# Patient Record
Sex: MALE | Race: BLACK OR AFRICAN AMERICAN
[De-identification: names, ages, dates, MRNs, and addresses within clinical notes are randomized per-mention and may not be internally consistent; named-entity substitution may affect disease eponyms.]

---

## 2018-02-06 ENCOUNTER — HOSPITAL ENCOUNTER (EMERGENCY)
Dept: HOSPITAL 72 - EMR | Age: 23
Discharge: HOME | End: 2018-02-06
Payer: SELF-PAY

## 2018-02-06 VITALS — HEIGHT: 71 IN | BODY MASS INDEX: 22.4 KG/M2 | WEIGHT: 160 LBS

## 2018-02-06 VITALS — DIASTOLIC BLOOD PRESSURE: 71 MMHG | SYSTOLIC BLOOD PRESSURE: 104 MMHG

## 2018-02-06 VITALS — DIASTOLIC BLOOD PRESSURE: 75 MMHG | SYSTOLIC BLOOD PRESSURE: 110 MMHG

## 2018-02-06 DIAGNOSIS — J45.901: Primary | ICD-10-CM

## 2018-02-06 PROCEDURE — 94664 DEMO&/EVAL PT USE INHALER: CPT

## 2018-02-06 PROCEDURE — 99283 EMERGENCY DEPT VISIT LOW MDM: CPT

## 2018-02-06 PROCEDURE — 94640 AIRWAY INHALATION TREATMENT: CPT

## 2018-02-06 RX ADMIN — Medication SCH MCG: at 22:30

## 2018-02-06 RX ADMIN — Medication SCH MCG: at 22:43

## 2018-02-06 RX ADMIN — Medication SCH MCG: at 22:36

## 2018-02-06 RX ADMIN — ALBUTEROL SULFATE SCH MG: 2.5 SOLUTION RESPIRATORY (INHALATION) at 22:43

## 2018-02-06 RX ADMIN — ALBUTEROL SULFATE SCH MG: 2.5 SOLUTION RESPIRATORY (INHALATION) at 22:36

## 2018-02-06 RX ADMIN — ALBUTEROL SULFATE SCH MG: 2.5 SOLUTION RESPIRATORY (INHALATION) at 22:31

## 2018-02-07 NOTE — EMERGENCY ROOM REPORT
History of Present Illness


General


Chief Complaint:  Asthma


Source:  Patient





Present Illness


HPI


22-year-old male presents ED for evaluation.  Patient presents with shortness 

of breath and wheezing.  Started yesterday.  patient states history of asthma 

but has not required an inhaler for some time.  Per triage O2 sat in the 80s.  

Patient denies any fevers or chills.  Denies cough.  Denies chest pain.  Denies 

sick contacts or recent travel.  No other aggravating or leading factors.  

Denies any other associated symptoms


Allergies:  


Coded Allergies:  


     No Known Allergies (Unverified , 2/6/18)





Patient History


Past Medical History:  none


Past Surgical History:  none


Pertinent Family History:  none


Social History:  Denies: smoking, alcohol use, drug use


Immunizations:  UTD


Reviewed Nursing Documentation:  PMH: Agreed, PSxH: Agreed





Nursing Documentation-PMH


Past Medical History:  No History, Except For


Hx Asthma:  Yes - bronchitis





Review of Systems


All Other Systems:  negative except mentioned in HPI





Physical Exam





Vital Signs








  Date Time  Temp Pulse Resp B/P (MAP) Pulse Ox O2 Delivery O2 Flow Rate FiO2


 


2/6/18 21:59 99.0 94 18 104/71 88 Room Air  


 


2/6/18 22:15        88


 


2/6/18 22:25       2.0 








Sp02 EP Interpretation:  reviewed, abnormal


General Appearance:  no apparent distress, alert, GCS 15, non-toxic


Head:  normocephalic, atraumatic


ENT:  normal ENT inspection


Neck:  normal inspection


Respiratory:  decreased breath sounds, wheezing


Cardiovascular #1:  regular rate, rhythm, no edema


Gastrointestinal:  normal inspection


Rectal:  deferred


Genitourinary:  no CVA tenderness


Musculoskeletal:  normal inspection


Neurologic:  alert, oriented x3, responsive, motor strength/tone normal, 

sensory intact, speech normal


Psychiatric:  normal inspection


Skin:  normal inspection


Lymphatic:  normal inspection





Medical Decision Making


Diagnostic Impression:  


 Primary Impression:  


 Asthma attack


 Qualified Codes:  J45.901 - Unspecified asthma with (acute) exacerbation


ER Course


Hospital Course 


22-year-old male presents to ED complaining of SOB, wheezing





Differential diagnoses include: URI, bronchitis, asthma/COPD, pneumonia 





Clinical course


Patient placed on stretcher.  After initial history, physical exam reveals a 

young male in no acute distress.  Bilateral TM unremarkable.  No pharyngeal 

erythema.  No tonsillar exudates.  No lymphadenopathy. diffuse wheezing noted 

on exam, no signs of respiratory distress or retractions.  





Patient given Prednisone and albuterol/atrovent treatment in ED with symptoms 

improved.  Reassurance given





Diagnosis - asthma attack





Stable and discharged home with prescriptions for prednisone, albuterol 

inhaler.  Instructed to followup with PMD.  Return to ED if symptoms recur or 

worsen





Last Vital Signs








  Date Time  Temp Pulse Resp B/P (MAP) Pulse Ox O2 Delivery O2 Flow Rate FiO2


 


2/6/18 23:38  98 14 110/75 96 Room Air  


 


2/6/18 22:51        21


 


2/6/18 22:45       2.0 


 


2/6/18 22:15 99.0       








Status:  improved


Disposition:  HOME, SELF-CARE


Condition:  Stable


Scripts


Prednisone* (PREDNISONE*) 20 Mg Tablet


40 MG ORAL DAILY, #10 TAB


   Prov: JOAN KEMP M.D.         2/6/18 


Albuterol Sulfate* (ALBUTEROL SULFATE MDI*) 8.5 Gm Hfa.aer.ad


2 PUFF INH Q4H Y for cough/wheezing, #1 EA 0 Refills


   Prov: JOAN KEMP M.D.         2/6/18


Referrals:  


NON PHYSICIAN (PCP)


Patient Instructions:  Asthma, Adult











JOAN KEMP M.D. Feb 7, 2018 01:26

## 2018-09-04 ENCOUNTER — HOSPITAL ENCOUNTER (EMERGENCY)
Dept: HOSPITAL 72 - EMR | Age: 23
Discharge: HOME | End: 2018-09-04
Payer: COMMERCIAL

## 2018-09-04 VITALS — BODY MASS INDEX: 17.32 KG/M2 | HEIGHT: 74 IN | WEIGHT: 135 LBS

## 2018-09-04 VITALS — SYSTOLIC BLOOD PRESSURE: 129 MMHG | DIASTOLIC BLOOD PRESSURE: 77 MMHG
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
## 2018-09-04 DIAGNOSIS — S83.91XA: Primary | ICD-10-CM

## 2018-09-04 DIAGNOSIS — J45.909: ICD-10-CM

## 2018-09-04 DIAGNOSIS — S80.01XA: ICD-10-CM

## 2018-09-04 PROCEDURE — 99283 EMERGENCY DEPT VISIT LOW MDM: CPT

## 2018-09-04 NOTE — EMERGENCY ROOM REPORT
History of Present Illness


General


Chief Complaint:  Pain


Source:  EMS





Present Illness


HPI


22 y/o male c/o medical clearance for custody. States he has right knee injury s

/p being apprehended but states he'd like to leave and wants a sandwich. States 

he's able to walk w/o difficulty.  Patient denies any numbness, tingling, 

pressure, paralysis, cyanosis, bruising, loss of sensation, or loss of range of 

motion.


Allergies:  


Coded Allergies:  


     No Known Allergies (Unverified , 11/16/12)





Patient History


Reviewed Nursing Documentation:  PMH: Agreed; PSxH: Agreed





Nursing Documentation-PMH


Past Medical History:  No History, Except For


Hx Asthma:  Yes





Review of Systems


All Other Systems:  negative except mentioned in HPI





Physical Exam





Vital Signs








  Date Time  Temp Pulse Resp B/P (MAP) Pulse Ox O2 Delivery O2 Flow Rate FiO2


 


9/4/18 19:31 98.2 90 20 129/77 98 Room Air  





 98.2       








Sp02 EP Interpretation:  reviewed, normal


General Appearance:  no apparent distress, alert, GCS 15, non-toxic


Head:  normocephalic, atraumatic


Eyes:  bilateral eye normal inspection, bilateral eye PERRL


Respiratory:  chest non-tender, lungs clear, normal breath sounds, speaking 

full sentences


Cardiovascular #1:  regular rate, rhythm, no edema, normal capillary refill


Musculoskeletal:  back normal, gait/station normal, normal range of motion, 

other - neg varus/ valgus. Neg a/p drawer, tender - right knee


Neurologic:  alert, oriented x3, responsive, motor strength/tone normal, 

sensory intact, speech normal


Skin:  normal color, no rash, warm/dry, well hydrated, abrasions - right 

peripatellar region


Lymphatic:  no adenopathy





Medical Decision Making


PA Attestation


Dr. Jauregui my supervising physician with whom patient management has been 

discussed with.


Diagnostic Impression:  


 Primary Impression:  


 Contusion of right knee


 Qualified Codes:  S80.01XA - Contusion of right knee, initial encounter


 Additional Impression:  


 Abrasion


ER Course


Pt. presents to the ED c/o right knee pain


Ddx considered but are not limited to abrasion, contusion, fracture, ligament 

injury, sprain. 


Vital signs: are WNL, pt. is afebrile 


H&PE are most consistent with abrasion of right knee with contusion


ORDERS: None required at this time. Diagnosis is clinical.


ED INTERVENTIONS: Wound irrigation. TDAP is UTD. 


DISCHARGE: At this time pt. is stable for d/c to home. Will provide printed 

patient care instructions, and any necessary prescriptions. Care plan and 

follow up instructions have been discussed with the patient prior to discharge.





Last Vital Signs








  Date Time  Temp Pulse Resp B/P (MAP) Pulse Ox O2 Delivery O2 Flow Rate FiO2


 


9/4/18 19:31 98.2 90 20 129/77 98 Room Air  





 98.2       








Disposition:  HOME, SELF-CARE


Condition:  Stable


Patient Instructions:  Abrasion, Knee Sprain





Additional Instructions:  


Keep wound clean and dry. Avoid sun exposure to minimize scarring. Patient 

advised that they can take a shower or bath, but be sure to pat the area dry 

with a towel afterward. Patient should come back sooner if they experience any 

red areas that get bigger, more swollen, have pus draining from wound, or if 

the site becomes more painful.  Patient advised to follow up with primary care 

provider within next 3-5 days. Advised patient to use RICE therapy and nsaids 

as needed. Patient is to go to the ER immediately if they experience any pain 

that is not responding to medication, excess swelling, pressure feeling, loss 

of color, cyanosis, paralysis, or numbness.











Maximo Burt Sep 4, 2018 20:35
daily

## 2020-12-02 ENCOUNTER — HOSPITAL ENCOUNTER (EMERGENCY)
Dept: HOSPITAL 72 - EMR | Age: 25
Discharge: HOME | End: 2020-12-02
Payer: COMMERCIAL

## 2020-12-02 VITALS — SYSTOLIC BLOOD PRESSURE: 132 MMHG | DIASTOLIC BLOOD PRESSURE: 90 MMHG

## 2020-12-02 VITALS — HEIGHT: 71 IN | BODY MASS INDEX: 21 KG/M2 | WEIGHT: 150 LBS

## 2020-12-02 DIAGNOSIS — K02.9: Primary | ICD-10-CM

## 2020-12-02 DIAGNOSIS — J45.909: ICD-10-CM

## 2020-12-02 PROCEDURE — 99282 EMERGENCY DEPT VISIT SF MDM: CPT

## 2020-12-02 NOTE — NUR
ED Nurse Note:



Pt walked in to ED tootache and left mouth pain x2 days. Pt also reports left 
side headache. Pt unable to chew and open his mouth. AAOx4, verbally 
responsive. No SOB, on room air. ERMD at bedside.

## 2020-12-02 NOTE — EMERGENCY ROOM REPORT
History of Present Illness


General


Chief Complaint:  Pain


Source:  Patient





Present Illness


HPI


Patient is a 25-year-old male denies any significant past medical history who 

presents to the ER complaining of dental pain for the past 2 days.  Patient 

states that he had some dental issues a month ago when he went to a dentist that

did a filling for him.  He states that he felt better until 2 days ago.  He 

states that he feels like he cannot open his mouth but he is able to open his 

mouth without difficulty.  He denies any fever or chills.  She denies any 

drooling or intraoral swelling.  He denies any changes to his voice.  He denies 

any throat pain.


Allergies:  


Coded Allergies:  


     No Known Allergies (Unverified , 2/6/18)





COVID-19 Screening


Contact w/high risk pt:  No


Experienced COVID-19 symptoms?:  No


COVID-19 Testing performed PTA:  Yes - 2 months ago


COVID-19 Screening:  Negative COVID-19


COVID-19 Testing Source:  clinic





Patient History


Reviewed Nursing Documentation:  PMH: Agreed; PSxH: Agreed





Nursing Documentation-PMH


Hx Asthma:  Yes - bronchitis





Review of Systems


All Other Systems:  negative except mentioned in HPI





Physical Exam





Vital Signs








  Date Time  Temp Pulse Resp B/P (MAP) Pulse Ox O2 Delivery O2 Flow Rate FiO2


 


12/2/20 08:17 98.1 81 19 132/90 (104) 98 Room Air  








Sp02 EP Interpretation:  reviewed, normal


General Appearance:  no apparent distress, alert, GCS 15, non-toxic


Head:  normocephalic, atraumatic


Eyes:  bilateral eye normal inspection, bilateral eye PERRL


ENT:  other


Neck:  full range of motion, no meningismus, supple/symm/no masses


Respiratory:  chest non-tender, lungs clear, normal breath sounds, speaking full

sentences


Cardiovascular #1:  regular rate, rhythm, no edema


Gastrointestinal:  non tender, soft


Rectal:  deferred


Musculoskeletal:  normal range of motion


Neurologic:  CNs III-XII nml as tested, oriented x3


Psychiatric:  no suicidal/homicidal ideation


Skin:  palpation normal





Medical Decision Making


Diagnostic Impression:  


   Primary Impression:  


   Dental caries


ER Course


Patient given Augmentin and Motrin.  Patient advised to follow-up with his 

dentist in the next day.  He states that he will call his dentist for further 

evaluation.  After discussing risks and benefits of further diagnostics, 

treatment plans, as well as indications for and risks of admission, the patient 

is agreeable to being discharged home.  I have explained that their evaluation 

and treatment in the emergency department today is an important step towards 

them achieving better health but that their evaluation today is not intended to 

replace further evaluation and treatment by a physician in their local clinic.  

I have explained that while the current findings suggest no immediate life 

threatening emergency they will require further evaluation and treatment by a 

physician of their choice in their area.  They understand that it will be 

necessary for them to review the final reports of their ED visit with their 

clinic physician.  We have reviewed indications for return to the Emergency 

Department.  I have explained that additional time may need to pass and/or 

additional testing as an outpatient may be necessary before a definitive 

diagnosis can be made.  They tell me they are willing to follow up as instructed

within the timeframe I recommend.  They appear to understand what we discussed. 

Additionally they understand that if they are unable to be seen by an outpatient

physician they are welcome, and in fact should, return to the Emergency 

Department for a repeat evaluation.  The patient is stable at time of discharge.





Last Vital Signs








  Date Time  Temp Pulse Resp B/P (MAP) Pulse Ox O2 Delivery O2 Flow Rate FiO2


 


12/2/20 08:22 98.1 81 19 132/90 98 Room Air  








Disposition:  HOME, SELF-CARE


Condition:  Stable


Scripts


Ibuprofen* (MOTRIN*) 600 Mg Tablet


600 MG ORAL Q6H PRN for FOR PAIN, #20 TAB 0 Refills


   Prov: Alie Paredes M.D.         12/2/20 


Amoxicillin/Potassium Clav 875-125* (AUGMENTIN 875-125 TABLET*) 1 Each Tablet


1 TAB ORAL TWICE A DAY, #20 TAB


   Prov: Alie Paredes M.D.         12/2/20


Referrals:  


[facility]





Pediatrics(age 2-12) - [phone]


   Orthodontic Clinic - [phone]





Hours: Mon,Wed,Thurs, 8:15am and 1pm (new patient screening), Tues. 1pm.


           Emergency clinic Monday - Friday 8:30am and 1pm, Tues. 1pm.


*Call to check if clinic is open; No appointment necessary for the first visit 

(new patient screening),


  Arrive 15-30 minutes early as it is first come, first serve. 





[facility]





INFO: New Patient Screening: Mon-Thurs 8am-1pm 


Mon- Thurs 9am -5pm and Fri 2pm-5pm


Patient Instructions:  Dental Pain, Easy-to-Read





Additional Instructions:  


Please follow-up with your dentist soon as possible.





The patient was provided with discharge instructions, notified to follow-up with

a primary care doctor and or specialist in the next 24-48 hours, and to return 

to the ED if they have worsening of their symptoms. 





Please note that this report is being documented using DRAGON technology.


This can lead to erroneous entry secondary to incorrect interpretation by the 

dictating instrument.











Alie Paredes M.D.         Dec 2, 2020 08:32

## 2021-01-20 ENCOUNTER — HOSPITAL ENCOUNTER (EMERGENCY)
Dept: HOSPITAL 72 - EMR | Age: 26
Discharge: HOME | End: 2021-01-20
Payer: COMMERCIAL

## 2021-01-20 VITALS — DIASTOLIC BLOOD PRESSURE: 76 MMHG | SYSTOLIC BLOOD PRESSURE: 118 MMHG

## 2021-01-20 VITALS — DIASTOLIC BLOOD PRESSURE: 74 MMHG | SYSTOLIC BLOOD PRESSURE: 116 MMHG

## 2021-01-20 VITALS — WEIGHT: 160 LBS | HEIGHT: 71 IN | BODY MASS INDEX: 22.4 KG/M2
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
## 2021-01-20 DIAGNOSIS — S80.11XA: Primary | ICD-10-CM
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
## 2021-01-20 DIAGNOSIS — Z79.899: ICD-10-CM

## 2021-01-20 DIAGNOSIS — J45.909: ICD-10-CM
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
## 2021-01-20 PROCEDURE — 99284 EMERGENCY DEPT VISIT MOD MDM: CPT

## 2021-01-20 PROCEDURE — 93971 EXTREMITY STUDY: CPT

## 2021-01-20 NOTE — EMERGENCY ROOM REPORT
History of Present Illness


General


Chief Complaint:  Pain


Source:  Patient





Present Illness


HPI


The patient has right shin up against a metal object.  He did suffer a skin 

avulsion and a contusion on his mid shin.  He states that that has been healing 

well.  However, he noticed changes in color of his skin below the wound of 

purple and red.  There has been no warmth or swelling.  There is been no fever 

or chills.  Patient has no other complaints.


Allergies:  


Coded Allergies:  


     No Known Allergies (Unverified , 2/6/18)





COVID-19 Screening


Contact w/high risk pt:  No


Experienced COVID-19 symptoms?:  No


COVID-19 Testing performed PTA:  No


COVID-19 Testing Source:  [facility]





Patient History


Past Medical History:  see triage record, asthma


Social History:  Denies: smoking, alcohol use, drug use


Reviewed Nursing Documentation:  PMH: Agreed; PSxH: Agreed





Nursing Documentation-PMH


Hx Asthma:  Yes





Review of Systems


All Other Systems:  negative except mentioned in HPI





Physical Exam





Vital Signs








  Date Time  Temp Pulse Resp B/P (MAP) Pulse Ox O2 Delivery O2 Flow Rate FiO2


 


1/20/21 20:11 98.1 85 20  99 Room Air  


 


1/20/21 20:21    116/74    








Sp02 EP Interpretation:  reviewed, normal


General Appearance:  no apparent distress, alert, GCS 15, non-toxic


Head:  normocephalic, atraumatic


Eyes:  bilateral eye normal inspection, bilateral eye PERRL


ENT:  hearing grossly normal, normal pharynx, no angioedema, normal voice


Neck:  normal inspection


Respiratory:  no respiratory distress, no retraction, no accessory muscle use, 

speaking full sentences


Rectal:  deferred


Musculoskeletal:  normal range of motion, gait/station normal, non-tender, other

- [id number] scabbed wound over R. anterior shin.  There is ecchymoses over the 

inferior aspect of the right shin.  It is in different stages of healing.  There

is no warmth.  There is slight swelling.


Neurologic:  alert, motor strength/tone normal, oriented x3, sensory intact, 

responsive, speech normal


Psychiatric:  judgement/insight normal, memory normal, mood/affect normal, no 

suicidal/homicidal ideation


Skin:  other - See MSK exam.





Medical Decision Making


Diagnostic Impression:  


   Primary Impression:  


   Contusion


   Additional Impressions:  


   Ecchymosis


   Healing wound


ER Course


And wound and ecchymoses of the right shin.  Patient is very concerned that he 

developed a blood clot.  There is some slight swelling and ecchymoses so I did 

obtain an venous Doppler ultrasound of the right lower extremity.  There is no 

evidence of DVT.  As a precaution, I will place the patient on a short course of

antibiotics as there is some slight swelling.  At this identify an emergency 

medical condition.  The patient is given close return precautions and follow-up 

instructions.


CT/MRI/US Diagnostic Results


CT/MRI/US Diagnostic Results :  


   Imaging Test Ordered:  US RLE


   Impression


No DVT.





Last Vital Signs








  Date Time  Temp Pulse Resp B/P (MAP) Pulse Ox O2 Delivery O2 Flow Rate FiO2


 


1/20/21 20:21 98.1 82 20 116/74 99 Room Air  








Status:  improved


Disposition:  HOME, SELF-CARE


Condition:  Improved


Referrals:  


[facility],REFERR[facility] (PCP)











Atiya Fong DO            Jan 20, 2021 21:07

## 2021-01-20 NOTE — NUR
ED Nurse Note: Pt placed in Rm 06. Pt walked into ED c/o right shin pain for 2 
weeks after hitting metal ramp. There is a black scab on right shin, with 
swelling around scab, right lower leg appears bruised. Pt states injury started 
as a scrape and he let it heal on his own.

## 2021-01-21 NOTE — DIAGNOSTIC IMAGING REPORT
Indication: Right leg pain and swelling

 

Technique: Grayscale and duplex Doppler imaging of the veins in the right lower

extremity performed in real time utilizing compression and augmentation.

 

Comparison: None

 

Findings:

 

Duplex Doppler interrogation of the veins in the right lower extremity is performed

from the common femoral vein to the popliteal vein. Normal venous compressibility

demonstrated throughout. No thrombus identified. Waveform analysis shows good

respiratory phasicity and augmentation.  Imaged calf veins are patent. 

 

IMPRESSION:

No evidence of deep venous thrombosis involving the  right lower extremity.

 

 

This corresponds with the preliminary report generated by the scanning ultrasound

technologist.